# Patient Record
Sex: FEMALE | Race: WHITE | Employment: PART TIME | URBAN - METROPOLITAN AREA
[De-identification: names, ages, dates, MRNs, and addresses within clinical notes are randomized per-mention and may not be internally consistent; named-entity substitution may affect disease eponyms.]

---

## 2017-12-18 ENCOUNTER — HOSPITAL ENCOUNTER (EMERGENCY)
Age: 27
Discharge: HOME | End: 2017-12-18
Attending: EMERGENCY MEDICINE
Payer: COMMERCIAL

## 2017-12-18 VITALS
OXYGEN SATURATION: 97 % | WEIGHT: 135 LBS | BODY MASS INDEX: 26.5 KG/M2 | SYSTOLIC BLOOD PRESSURE: 137 MMHG | HEIGHT: 60 IN | TEMPERATURE: 98.3 F | DIASTOLIC BLOOD PRESSURE: 90 MMHG | RESPIRATION RATE: 14 BRPM | HEART RATE: 82 BPM

## 2017-12-18 DIAGNOSIS — S81.812A LACERATION OF LEFT LOWER EXTREMITY, INITIAL ENCOUNTER: Primary | ICD-10-CM

## 2017-12-18 PROCEDURE — 99282 EMERGENCY DEPT VISIT SF MDM: CPT

## 2017-12-18 PROCEDURE — 12011 RPR F/E/E/N/L/M 2.5 CM/<: CPT

## 2017-12-18 RX ORDER — LIDOCAINE HYDROCHLORIDE 10 MG/ML
20 INJECTION, SOLUTION INFILTRATION; PERINEURAL ONCE
Status: DISCONTINUED | OUTPATIENT
Start: 2017-12-18 | End: 2017-12-18 | Stop reason: HOSPADM

## 2017-12-18 ASSESSMENT — ENCOUNTER SYMPTOMS
VOMITING: 0
COUGH: 0
NAUSEA: 0
SHORTNESS OF BREATH: 0
TROUBLE SWALLOWING: 0

## 2017-12-18 ASSESSMENT — PAIN DESCRIPTION - ORIENTATION: ORIENTATION: LEFT

## 2017-12-18 ASSESSMENT — PAIN DESCRIPTION - PAIN TYPE: TYPE: ACUTE PAIN

## 2017-12-18 ASSESSMENT — PAIN DESCRIPTION - LOCATION: LOCATION: LEG

## 2017-12-18 ASSESSMENT — PAIN SCALES - GENERAL: PAINLEVEL_OUTOF10: 2

## 2017-12-18 NOTE — ED PROVIDER NOTES
[address]  eMERGENCY dEPARTMENT eNCOUnter      Pt Name: Felicitas Boyer  MRN: [id number]  [place] 1990  Date of evaluation: 2017  Provider: [facility], Jorgito Michaud26       Chief Complaint   Patient presents with    Laceration         HISTORY OF PRESENT ILLNESS  (Location/Symptom, Timing/Onset, Context/Setting, Quality, Duration, Modifying Factors, Severity.)   Felicitas Boyer is a 32 y.o. female who presents to the emergency department for evaluation of laceration:     Location/Symptom: Laceration to left thigh  Timing/Onset: PTA  Context/Setting: Patient Presents to ED with complaints of laceration to left anterior thigh. States she cut it accidentally with clean  when making a frame. No focal weakness/paresthesias. Bleeding controlled with pressure. Tetanus UTD?   up to date. Quality: sharp, achy  Duration: constant  Modifying Factors: worse with movement  Severity: moderate    Nursing Notes were reviewed and I agree. REVIEW OF SYSTEMS    (2-9 systems for level 4, 10 or more for level 5)     Review of Systems   Constitutional: Negative for chills and fever. HENT: Negative for trouble swallowing. Respiratory: Negative for cough and shortness of breath. Cardiovascular: Negative for chest pain and palpitations. Gastrointestinal: Negative for nausea and vomiting. Skin: Positive for wound. Except as noted above the remainder of the review of systems was reviewed and negative.        PAST MEDICAL HISTORY         Diagnosis Date    Anemia of acute blood loss 3/2/2015    ASCUS (atypical squamous cells of undetermined significance) on Pap smear 14    (+) High Risk HPV    Bipolar disorder ([facility] [address]     Cervical high risk human papillomavirus (HPV) DNA test positive 14    Constipation     due to endometriosis    Constipation     Depression 3/4/2014    Endometriosis- Clinical 2014    History of spontaneous  April 2010, Oct 2013    @ 6wks, @ 8 weeks (3-4 total)    History of total abdominal hysterectomy 10/19/15    MERCEDES/BS/RO/Enterocele repair [facility] Dr. Narciso Edouard    HSIL (high grade squamous intraepithelial lesion) on colposcopy/ECC 4/16/2015    Hx of migraines     Hypertension     HX OF AS A TEENAGER    Irregular menses     Manic depressive disorder (HCC)     Pelvic pain     Smoker      Reviewed. SURGICAL HISTORY           Procedure Laterality Date    APPENDECTOMY  10/19/15    COLPOSCOPY  7/13/15    DILATION AND CURETTAGE  11/4/13    fetal demise/suction D&C    ENTEROCELE REPAIR  10/19/15    [facility], Dr. Jon Crow, [address]  10 19 15    [facility]/BS/RO [facility], Dr. Raciel Tristan  7/13/15    diagnostic with LEEP    LEEP  7/13/15    MYRINGOTOMY AND TYMPANOSTOMY TUBE PLACEMENT Bilateral     TONSILLECTOMY AND ADENOIDECTOMY      TOOTH EXTRACTION      2 teeth extracted     Reviewed. CURRENT MEDICATIONS       Previous Medications    BUPROPION SR (WELLBUTRIN SR) 150 MG SR TABLET        CHOLECALCIFEROL (VITAMIN D) 2000 UNITS TABS        CRANBERRY PO    Take by mouth    CYANOCOBALAMIN (VITAMIN B 12 PO)    Take by mouth    DOCUSATE SODIUM (COLACE, DULCOLAX) 100 MG CAPS    Take 100 mg by mouth 2 times daily as needed for Constipation    FLAXSEED, LINSEED, (FLAX SEEDS PO)    Take by mouth    GABAPENTIN (NEURONTIN) 100 MG CAPSULE    Take 1 capsule by mouth 3 times daily TAKE ONE AT BED TIME 8PM X 3 DAYS   THEN ONE TWICE A DAY 8AM AND 8 PM X 3 DAYS   THENONE THREE TIMES A DAY AT 8AM 2PM AND 8PM    IBUPROFEN (ADVIL;MOTRIN) 600 MG TABLET    Take 1 tablet by mouth every 6 hours as needed for Pain    LEUPROLIDE (LUPRON DEPOT) 3.75 MG INJECTION    Inject 3.75 mg into the muscle every 28 days Bring to office for injection. LIDOCAINE (LMX) 4 % CREAM    Apply One to two inch length topically as needed four times a day.     OMEGA-3 FATTY ACIDS (FISH OIL PO)    Take by mouth diaphoretic. Psychiatric: She has a normal mood and affect. Her behavior is normal.       DIAGNOSTIC RESULTS     RADIOLOGY:   none      LABS:  Labs Reviewed - No data to display    All other labs were within normal range or not returned as of this dictation. EMERGENCY DEPARTMENT COURSE and DIFFERENTIAL DIAGNOSIS/MDM:   Patient to ED for evaluation of laceration. Repaired with sutures. Ok to discharge home. Wound instructions given. Sutures out in 10 days. Follow up with PCP in 1-2 days for a recheck. Return to ED if worsening pain, bleeding, signs of infection of other emergent symptoms    Vitals:    Vitals:    12/18/17 1220   BP: (!) 137/90   Pulse: 82   Resp: 14   Temp: 98.3 °F (36.8 °C)   TempSrc: Oral   SpO2: 97%   Weight: 135 lb (61.2 kg)   Height: 5' (1.524 m)       Vitals reviewed. PROCEDURES:  Lac Repair  Date/Time: 12/18/2017 1:16 PM  Performed by: Chaparrita Session by: Adán Lanier     Consent:     Consent obtained:  Verbal    Consent given by:  Patient    Risks discussed:  Infection and pain    Alternatives discussed:  No treatment  Anesthesia (see MAR for exact dosages): Anesthesia method:  Local infiltration    Local anesthetic:  Lidocaine 1% w/o epi  Laceration details:     Location:  Leg    Leg location:  L upper leg    Length (cm):  2  Repair type:     Repair type:  Simple  Pre-procedure details:     Preparation:  Patient was prepped and draped in usual sterile fashion  Treatment:     Area cleansed with:  Betadine    Irrigation solution:  Sterile saline  Skin repair:     Repair method:  Sutures    Suture size:  4-0    Suture material:  Nylon    Suture technique:  Simple interrupted    Number of sutures:  5  Post-procedure details:     Dressing:  Antibiotic ointment and non-adherent dressing    Patient tolerance of procedure: Tolerated well, no immediate complications          FINAL IMPRESSION      1.  Laceration of left lower extremity, initial encounter

## 2020-01-16 ENCOUNTER — HOSPITAL ENCOUNTER (EMERGENCY)
Age: 30
Discharge: HOME | End: 2020-01-16
Attending: EMERGENCY MEDICINE
Payer: COMMERCIAL

## 2020-01-16 ENCOUNTER — APPOINTMENT (OUTPATIENT)
Dept: GENERAL RADIOLOGY | Age: 30
End: 2020-01-16
Payer: COMMERCIAL

## 2020-01-16 VITALS
SYSTOLIC BLOOD PRESSURE: 122 MMHG | TEMPERATURE: 98.1 F | RESPIRATION RATE: 14 BRPM | WEIGHT: 120 LBS | DIASTOLIC BLOOD PRESSURE: 79 MMHG | HEART RATE: 90 BPM | HEIGHT: 60 IN | BODY MASS INDEX: 23.56 KG/M2 | OXYGEN SATURATION: 99 %

## 2020-01-16 PROCEDURE — 73130 X-RAY EXAM OF HAND: CPT

## 2020-01-16 PROCEDURE — 99283 EMERGENCY DEPT VISIT LOW MDM: CPT

## 2020-01-16 PROCEDURE — 73110 X-RAY EXAM OF WRIST: CPT

## 2020-01-16 ASSESSMENT — PAIN DESCRIPTION - LOCATION: LOCATION: WRIST

## 2020-01-16 ASSESSMENT — PAIN DESCRIPTION - ORIENTATION: ORIENTATION: RIGHT

## 2020-01-16 ASSESSMENT — PAIN SCALES - GENERAL: PAINLEVEL_OUTOF10: 3

## 2020-01-16 ASSESSMENT — PAIN DESCRIPTION - PAIN TYPE: TYPE: ACUTE PAIN

## 2020-01-16 ASSESSMENT — PAIN DESCRIPTION - DESCRIPTORS: DESCRIPTORS: SHARP

## 2020-01-16 NOTE — ED PROVIDER NOTES
[address]  eMERGENCY dEPARTMENT eNCOUnter      Pt Name: Lakisha Virk  MRN: [id number]  [place] 1990  Date of evaluation: 2020  Provider: Peggi Claude, PA-C    CHIEF COMPLAINT       Chief Complaint   Patient presents with    Wrist Injury           HISTORY OF PRESENT ILLNESS  (Location/Symptom, Timing/Onset, Context/Setting, Quality, Duration, Modifying Factors, Severity.)   Lakisha Virk is a 34 y.o. female who presents to the emergency department with complaints of right hand and wrist pain. Pt states last night she fell while skating. States the pain is 3/10 over palm of hand and wrist.  Pain will radiate into forearm with movement. Denies numbness. She is right handed. No other complaints. Nursing Notes were reviewed. REVIEW OF SYSTEMS    (2-9 systems for level 4, 10 or more for level 5)     Review of Systems   Hand, wrist pain      Except as noted above the remainder of the review of systems was reviewed and negative.        PAST MEDICAL HISTORY     Past Medical History:   Diagnosis Date    Anemia of acute blood loss 3/2/2015    ASCUS (atypical squamous cells of undetermined significance) on Pap smear 14    (+) High Risk HPV    Bipolar disorder ([facility] [address]     Cervical high risk human papillomavirus (HPV) DNA test positive 14    Constipation     due to endometriosis    Constipation     Depression 3/4/2014    Endometriosis- Clinical 2014    History of spontaneous  2010, Oct 2013    @ 6wks, @ 8 weeks (3-4 total)    History of total abdominal hysterectomy 10/19/15    MERCEDES/BS/RO/Enterocele repair Kana Varma    HSIL (high grade squamous intraepithelial lesion) on colposcopy/ECC 2015    Hx of migraines     Hypertension     HX OF AS A TEENAGER    Irregular menses     Manic depressive disorder (HCC)     Pelvic pain     Smoker      None otherwise stated in nurses notes    SURGICAL HISTORY       Past Surgical History:   Procedure Laterality Date    APPENDECTOMY  10/19/15    COLPOSCOPY  7/13/15    DILATION AND CURETTAGE  11/4/13    fetal demise/suction D&C    ENTEROCELE REPAIR  10/19/15    [facility], Dr. Juanjose Henao, TOTAL ABDOMINAL  10 19 15    MERCEDES/BS/RO [facility], Dr. Brunilda Cross  7/13/15    diagnostic with LEEP    LEEP  7/13/15    MYRINGOTOMY AND TYMPANOSTOMY TUBE PLACEMENT Bilateral     TONSILLECTOMY AND ADENOIDECTOMY      TOOTH EXTRACTION      2 teeth extracted     None otherwise stated in nurses notes    CURRENT MEDICATIONS       Previous Medications    BUPROPION SR (WELLBUTRIN SR) 150 MG SR TABLET        CHOLECALCIFEROL (VITAMIN D) 2000 UNITS TABS        CRANBERRY PO    Take by mouth    CYANOCOBALAMIN (VITAMIN B 12 PO)    Take by mouth    DOCUSATE SODIUM (COLACE, DULCOLAX) 100 MG CAPS    Take 100 mg by mouth 2 times daily as needed for Constipation    FLAXSEED, LINSEED, (FLAX SEEDS PO)    Take by mouth    GABAPENTIN (NEURONTIN) 100 MG CAPSULE    Take 1 capsule by mouth 3 times daily TAKE ONE AT BED TIME 8PM X 3 DAYS   THEN ONE TWICE A DAY 8AM AND 8 PM X 3 DAYS   THENONE THREE TIMES A DAY AT 8AM 2PM AND 8PM    IBUPROFEN (ADVIL;MOTRIN) 600 MG TABLET    Take 1 tablet by mouth every 6 hours as needed for Pain    LEUPROLIDE (LUPRON DEPOT) 3.75 MG INJECTION    Inject 3.75 mg into the muscle every 28 days Bring to office for injection. LIDOCAINE (LMX) 4 % CREAM    Apply One to two inch length topically as needed four times a day. OMEGA-3 FATTY ACIDS (FISH OIL PO)    Take by mouth       ALLERGIES     Patient has no known allergies.     FAMILY HISTORY           Problem Relation Age of Onset    Hypertension Father     Heart Attack Father     Mental Illness Father         borderline schizophrenic    Stroke Mother     Depression Mother     Mental Illness Mother     Diabetes Mother     COPD Mother     Other Mother         fibromyalgia    Seizures Other     Other Other tenderness. FROM. 5/5 strength. Good  strength. Brisk cap refill. Distal sensation intact. 2/2 radial pulse. Neurological: Alert and oriented to person, place, and time. GCS score is 15. Skin: Skin is warm and dry. No rash noted. No erythema. No pallor. Psychiatric: Mood, memory, affect and judgment normal.           DIAGNOSTIC RESULTS     EKG: All EKG's are interpreted by the Emergency Department Physician who either signs or Co-signs this chart in the absence of a cardiologist.        RADIOLOGY:   All plain film, CT, MRI, and formal ultrasound images (except ED bedside ultrasound) are read by the radiologist, see reports below, unless otherwise noted in MDM or here. XR HAND RIGHT (MIN 3 VIEWS)   Final Result   No acute bony or joint abnormalities seen in the right hand or right wrist         XR WRIST RIGHT (MIN 3 VIEWS)   Final Result   No acute bony or joint abnormalities seen in the right hand or right wrist             No results found. LABS:  Labs Reviewed - No data to display    All other labs were within normal range or not returned as of this dictation. EMERGENCY DEPARTMENT COURSE and DIFFERENTIAL DIAGNOSIS/MDM:   Vitals:    Vitals:    01/16/20 1325   BP: 122/79   Pulse: 90   Resp: 14   Temp: 98.1 °F (36.7 °C)   TempSrc: Temporal   SpO2: 99%   Weight: 120 lb (54.4 kg)   Height: 5' (1.524 m)         Patient instructed to return to the emergency room if symptoms worsen, return, or any other concern right away which is agreed by the patient    ED MEDS:  No orders of the defined types were placed in this encounter. CONSULTS:  None    PROCEDURES:  None      FINAL IMPRESSION      1.  Sprain of right wrist, initial encounter          DISPOSITION/PLAN   DISPOSITION Decision To Discharge    PATIENT REFERRED TO:  [facility] SPECIALIZED SURGERY  [address]  [address] [address] [phone]  Call       [facility] ED  [facility] [place] [address] [facility], [facility], [address] [facility]  [address]  [phone]            DISCHARGE MEDICATIONS:  New Prescriptions    No medications on file         Summation      Patient Course:    Fall, right hand and wrist pain. On exam, no deformities. No snuff box tenderness. xrays are unremarkable. Will provide wrist splint. Referral to ortho. Rest, ice, elevate. Discussed results and plan with the pt. They expressed appropriate understanding. Pt given close follow up, supportive care instructions and strict return instructions at the bedside. ED Medications administered this visit:  Medications - No data to display    New Prescriptions from this visit:    New Prescriptions    No medications on file       Follow-up:  [facility] SUR[facility]ERY  [facility] [address] [phone]  Call       [facility] ED  [facility] [id number]  [address] [place] [address] [facility], [address] [facility]  [address]  [phone]              Final Impression:   1.  Sprain of right wrist, initial encounter               (Please note that portions of this note were completed with a voice recognition program.  Efforts were made to edit the dictations but occasionally words are mis-transcribed.)      (Please note that portions of this note were completed with a voice recognition program.  Efforts were made to edit the dictations but occasionally words are mis-transcribed.)    Donna Medina. [address], PA-C  01/16/20 1427

## 2022-04-17 ENCOUNTER — APPOINTMENT (OUTPATIENT)
Dept: CT IMAGING | Age: 32
End: 2022-04-17
Payer: COMMERCIAL

## 2022-04-17 ENCOUNTER — HOSPITAL ENCOUNTER (EMERGENCY)
Age: 32
Discharge: HOME | End: 2022-04-18
Attending: EMERGENCY MEDICINE
Payer: COMMERCIAL

## 2022-04-17 VITALS
HEIGHT: 60 IN | OXYGEN SATURATION: 99 % | WEIGHT: 130 LBS | SYSTOLIC BLOOD PRESSURE: 134 MMHG | BODY MASS INDEX: 25.52 KG/M2 | TEMPERATURE: 98.5 F | HEART RATE: 90 BPM | RESPIRATION RATE: 16 BRPM | DIASTOLIC BLOOD PRESSURE: 86 MMHG

## 2022-04-17 DIAGNOSIS — L03.211 FACIAL CELLULITIS: Primary | ICD-10-CM

## 2022-04-17 LAB
ABSOLUTE EOS #: 0.2 K/UL (ref 0–0.4)
ABSOLUTE LYMPH #: 2.7 K/UL (ref 1–4.8)
ABSOLUTE MONO #: 1 K/UL (ref 0.1–1.3)
ANION GAP SERPL CALCULATED.3IONS-SCNC: 11 MMOL/L (ref 9–17)
BASOPHILS # BLD: 1 % (ref 0–2)
BASOPHILS ABSOLUTE: 0.1 K/UL (ref 0–0.2)
BUN BLDV-MCNC: 4 MG/DL (ref 6–20)
CALCIUM SERPL-MCNC: 8.2 MG/DL (ref 8.6–10.4)
CHLORIDE BLD-SCNC: 99 MMOL/L (ref 98–107)
CO2: 23 MMOL/L (ref 20–31)
CREAT SERPL-MCNC: <0.4 MG/DL (ref 0.5–0.9)
EOSINOPHILS RELATIVE PERCENT: 2 % (ref 0–4)
GFR AFRICAN AMERICAN: ABNORMAL ML/MIN
GFR NON-AFRICAN AMERICAN: ABNORMAL ML/MIN
GFR SERPL CREATININE-BSD FRML MDRD: ABNORMAL ML/MIN/{1.73_M2}
GLUCOSE BLD-MCNC: 98 MG/DL (ref 70–99)
HCT VFR BLD CALC: 40 % (ref 36–46)
HEMOGLOBIN: 14 G/DL (ref 12–16)
LYMPHOCYTES # BLD: 26 % (ref 24–44)
MAGNESIUM: 1.6 MG/DL (ref 1.6–2.6)
MCH RBC QN AUTO: 35.6 PG (ref 26–34)
MCHC RBC AUTO-ENTMCNC: 34.9 G/DL (ref 31–37)
MCV RBC AUTO: 101.9 FL (ref 80–100)
MONOCYTES # BLD: 10 % (ref 1–7)
PDW BLD-RTO: 13.8 % (ref 11.5–14.9)
PLATELET # BLD: 282 K/UL (ref 150–450)
PMV BLD AUTO: 8.3 FL (ref 6–12)
POTASSIUM SERPL-SCNC: 3.1 MMOL/L (ref 3.7–5.3)
RBC # BLD: 3.92 M/UL (ref 4–5.2)
SEG NEUTROPHILS: 61 % (ref 36–66)
SEGMENTED NEUTROPHILS ABSOLUTE COUNT: 6.4 K/UL (ref 1.3–9.1)
SODIUM BLD-SCNC: 133 MMOL/L (ref 135–144)
WBC # BLD: 10.3 K/UL (ref 3.5–11)

## 2022-04-17 PROCEDURE — 70491 CT SOFT TISSUE NECK W/DYE: CPT

## 2022-04-17 PROCEDURE — 6360000004 HC RX CONTRAST MEDICATION: Performed by: STUDENT IN AN ORGANIZED HEALTH CARE EDUCATION/TRAINING PROGRAM

## 2022-04-17 PROCEDURE — 99283 EMERGENCY DEPT VISIT LOW MDM: CPT

## 2022-04-17 PROCEDURE — 6360000002 HC RX W HCPCS: Performed by: STUDENT IN AN ORGANIZED HEALTH CARE EDUCATION/TRAINING PROGRAM

## 2022-04-17 PROCEDURE — 85025 COMPLETE CBC W/AUTO DIFF WBC: CPT

## 2022-04-17 PROCEDURE — 96375 TX/PRO/DX INJ NEW DRUG ADDON: CPT

## 2022-04-17 PROCEDURE — 2580000003 HC RX 258: Performed by: STUDENT IN AN ORGANIZED HEALTH CARE EDUCATION/TRAINING PROGRAM

## 2022-04-17 PROCEDURE — 80048 BASIC METABOLIC PNL TOTAL CA: CPT

## 2022-04-17 PROCEDURE — 96376 TX/PRO/DX INJ SAME DRUG ADON: CPT

## 2022-04-17 PROCEDURE — 83735 ASSAY OF MAGNESIUM: CPT

## 2022-04-17 PROCEDURE — 36415 COLL VENOUS BLD VENIPUNCTURE: CPT

## 2022-04-17 PROCEDURE — 2500000003 HC RX 250 WO HCPCS: Performed by: STUDENT IN AN ORGANIZED HEALTH CARE EDUCATION/TRAINING PROGRAM

## 2022-04-17 PROCEDURE — 96365 THER/PROPH/DIAG IV INF INIT: CPT

## 2022-04-17 RX ORDER — ONDANSETRON 2 MG/ML
4 INJECTION INTRAMUSCULAR; INTRAVENOUS ONCE
Status: COMPLETED | OUTPATIENT
Start: 2022-04-17 | End: 2022-04-17

## 2022-04-17 RX ORDER — SODIUM CHLORIDE 0.9 % (FLUSH) 0.9 %
10 SYRINGE (ML) INJECTION PRN
Status: DISCONTINUED | OUTPATIENT
Start: 2022-04-17 | End: 2022-04-18 | Stop reason: HOSPADM

## 2022-04-17 RX ORDER — CLINDAMYCIN PHOSPHATE 600 MG/50ML
600 INJECTION INTRAVENOUS ONCE
Status: COMPLETED | OUTPATIENT
Start: 2022-04-17 | End: 2022-04-18

## 2022-04-17 RX ORDER — 0.9 % SODIUM CHLORIDE 0.9 %
80 INTRAVENOUS SOLUTION INTRAVENOUS ONCE
Status: COMPLETED | OUTPATIENT
Start: 2022-04-17 | End: 2022-04-17

## 2022-04-17 RX ORDER — IBUPROFEN 400 MG/1
800 TABLET ORAL EVERY 8 HOURS PRN
Qty: 120 TABLET | Refills: 0 | Status: SHIPPED | OUTPATIENT
Start: 2022-04-17

## 2022-04-17 RX ORDER — MORPHINE SULFATE 2 MG/ML
2 INJECTION, SOLUTION INTRAMUSCULAR; INTRAVENOUS ONCE
Status: COMPLETED | OUTPATIENT
Start: 2022-04-17 | End: 2022-04-17

## 2022-04-17 RX ORDER — CLINDAMYCIN HYDROCHLORIDE 150 MG/1
450 CAPSULE ORAL 3 TIMES DAILY
Qty: 63 CAPSULE | Refills: 0 | Status: SHIPPED | OUTPATIENT
Start: 2022-04-17 | End: 2022-04-24

## 2022-04-17 RX ADMIN — SODIUM CHLORIDE, PRESERVATIVE FREE 10 ML: 5 INJECTION INTRAVENOUS at 22:59

## 2022-04-17 RX ADMIN — MORPHINE SULFATE 2 MG: 2 INJECTION, SOLUTION INTRAMUSCULAR; INTRAVENOUS at 23:29

## 2022-04-17 RX ADMIN — ONDANSETRON 4 MG: 2 INJECTION INTRAMUSCULAR; INTRAVENOUS at 22:09

## 2022-04-17 RX ADMIN — SODIUM CHLORIDE 80 ML: 9 INJECTION, SOLUTION INTRAVENOUS at 22:59

## 2022-04-17 RX ADMIN — CLINDAMYCIN IN 5 PERCENT DEXTROSE 600 MG: 12 INJECTION, SOLUTION INTRAVENOUS at 23:30

## 2022-04-17 RX ADMIN — IOPAMIDOL 75 ML: 755 INJECTION, SOLUTION INTRAVENOUS at 22:59

## 2022-04-17 RX ADMIN — MORPHINE SULFATE 2 MG: 2 INJECTION, SOLUTION INTRAMUSCULAR; INTRAVENOUS at 22:10

## 2022-04-17 ASSESSMENT — PAIN SCALES - GENERAL
PAINLEVEL_OUTOF10: 9
PAINLEVEL_OUTOF10: 10

## 2022-04-17 ASSESSMENT — ENCOUNTER SYMPTOMS
CHEST TIGHTNESS: 0
ABDOMINAL PAIN: 0
FACIAL SWELLING: 1
SINUS PAIN: 0
SINUS PRESSURE: 0
BACK PAIN: 0
DIARRHEA: 0
NAUSEA: 0
COUGH: 0
SORE THROAT: 0
CONSTIPATION: 0
VOICE CHANGE: 0
SHORTNESS OF BREATH: 0
TROUBLE SWALLOWING: 1
VOMITING: 0
COLOR CHANGE: 0

## 2022-04-17 ASSESSMENT — PAIN DESCRIPTION - PAIN TYPE: TYPE: ACUTE PAIN

## 2022-04-17 ASSESSMENT — PAIN DESCRIPTION - DIRECTION: RADIATING_TOWARDS: EAR

## 2022-04-17 ASSESSMENT — PAIN DESCRIPTION - ORIENTATION: ORIENTATION: LEFT

## 2022-04-17 ASSESSMENT — PAIN DESCRIPTION - LOCATION: LOCATION: FACE

## 2022-04-17 ASSESSMENT — PAIN - FUNCTIONAL ASSESSMENT: PAIN_FUNCTIONAL_ASSESSMENT: 0-10

## 2022-04-18 NOTE — ED PROVIDER NOTES
[address] ED  eMERGENCY dEPARTMENT eNCOUnter   Attending Attestation     Pt Name: Apple Hayes  MRN: [id number]  [place] 1990  Date of evaluation: 4/17/22    History, EXAM, MDM:    Apple Hayes is a 28 y.o. female who presents with Facial Swelling (L)    Presenting with 2 days of left facial swelling and pain. On exam the patient has significant erythema edema tenderness on her left mandible area that is starting to spread down her neck. No apical abscess amenable to bedside drainage. Laboratory studies checked and show mild hypokalemia. CT scan shows celluitis. Pt treated with IV antiboitics and placed on oral antibiotics. Recommended close follow up with pcp and dentist, return to ED if symptoms worsen, and warning precautions provided. .     Vitals:   Vitals:    04/17/22 2121   BP: 134/86   Pulse: 90   Resp: 16   Temp: 98.5 °F (36.9 °C)   TempSrc: Oral   SpO2: 99%   Weight: 130 lb (59 kg)   Height: 5' (1.524 m)     I performed a history and physical examination of the patient and discussed management with the resident. I reviewed the residents note and agree with the documented findings and plan of care. Any areas of disagreement are noted on the chart. I was personally present for the key portions of any procedures. I have documented in the chart those procedures where I was not present during the key portions. I have personally reviewed all images and agree with the resident's interpretation. I have reviewed the emergency nurses triage note. I agree with the chief complaint, past medical history, past surgical history, allergies, medications, social and family history as documented unless otherwise noted below. Documentation of the HPI, Physical Exam and Medical Decision Making performed by medical students or scribes is based on my personal performance of the HPI, PE and MDM.  For Phys Assistant/ Nurse Practitioner cases/documentation I have had a face to face evaluation of this patient and have completed at least one if not all key elements of the E/M (history, physical exam, and MDM). Additional findings are as noted.     Gautam Lim MD  Attending Emergency  Physician                Gautam Lim MD  04/18/22 0211

## 2022-04-18 NOTE — ED TRIAGE NOTES
Mode of arrival (squad #, walk in, police, etc) : walk-in        Chief complaint(s): L facial swelling        Arrival Note (brief scenario, treatment PTA, etc). : c/o increased L facial swelling x1-2 days with radiation to L ear. Took tylenol with no relief. Attempted heat application, swelling increased. Admits to old tooth fracture. Able to speak clearly and maintain secretions. C= \"Have you ever felt that you should Cut down on your drinking? \"  No  A= \"Have people Annoyed you by criticizing your drinking? \"  No  G= \"Have you ever felt bad or Guilty about your drinking? \"  No  E= \"Have you ever had a drink as an Eye-opener first thing in the morning to steady your nerves or to help a hangover? \"  No      Deferred []      Reason for deferring: N/A    *If yes to two or more: probable alcohol abuse. *

## 2022-04-18 NOTE — ED PROVIDER NOTES
[address] [facility] ED  Emergency Department Encounter  Emergency Medicine Resident     Pt Name: Vanessa Guardado  MRN: [id number]  [place] 1990  Date of evaluation: 22  PCP:  No primary care provider on file. CHIEF COMPLAINT       Chief Complaint   Patient presents with    Facial Swelling     L       HISTORY OFPRESENT ILLNESS  (Location/Symptom, Timing/Onset, Context/Setting, Quality, Duration, Modifying Factors,Severity.)      Vanessa Guardado is a 28 y. o.yo female who presents with concern for acute onset left-sided facial swelling. Patient states that she has known broken teeth on the left lower side that she is scheduled to get pulled this week. Patient states that yesterday she began develop some mild swelling noted to the left lower jaw. She states today this is quickly progressed and now she is having ear pain as well as swelling going down into her neck. Patient states that within the last 20 minutes she feels a little bit difficulty swallowing. She denies any fever, chills nausea or vomiting. Patient does admit to heavy smoking, states that she has had broken teeth for quite some time but has never had swelling like this previously. She reports mild pain with opening her mouth though she is still able to. PAST MEDICAL / SURGICAL / SOCIAL / FAMILY HISTORY      has a past medical history of Anemia of acute blood loss, ASCUS (atypical squamous cells of undetermined significance) on Pap smear, Bipolar disorder ([address] Cervical high risk human papillomavirus (HPV) DNA test positive, Constipation, Constipation, Depression, Endometriosis- Clinical, History of spontaneous , History of total abdominal hysterectomy, HSIL (high grade squamous intraepithelial lesion) on colposcopy/ECC, Hx of migraines, Hypertension, Irregular menses, Manic depressive disorder ([address] Pelvic pain, and Smoker.      has a past surgical history that includes Dilation & curettage (13); Myringotomy Tympanostomy Tube Placement (Bilateral); LEEP (7/13/15); Colposcopy (7/13/15); laparoscopy (7/13/15); Tonsillectomy and adenoidectomy; Tooth Extraction; Appendectomy (10/19/15); Hysterectomy, total abdominal (10 19 15); and Enterocele repair (10/19/15). Social History     Socioeconomic History    Marital status:      Spouse name: Not on file    Number of children: 2    Years of education: Not on file    Highest education level: Not on file   Occupational History    Not on file   Tobacco Use    Smoking status: Current Some Day Smoker     Packs/day: 2.00     Types: Cigars     Last attempt to quit: 2014     Years since quittin.6    Smokeless tobacco: Never Used    Tobacco comment: black & mild cigars, 2 per day   Substance and Sexual Activity    Alcohol use: Yes     Alcohol/week: 0.0 standard drinks     Comment: social    Drug use: No     Comment: 7-8 years marijuana use    Sexual activity: Yes     Partners: Male     Birth control/protection: Surgical     Comment: hyst   Other Topics Concern    Not on file   Social History Narrative    Not on file     Social Determinants of Health     Financial Resource Strain:     Difficulty of Paying Living Expenses: Not on file   Food Insecurity:     Worried About Running Out of Food in the Last Year: Not on file    Andrew of Food in the Last Year: Not on file   Transportation Needs:     Lack of Transportation (Medical): Not on file    Lack of Transportation (Non-Medical):  Not on file   Physical Activity:     Days of Exercise per Week: Not on file    Minutes of Exercise per Session: Not on file   Stress:     Feeling of Stress : Not on file   Social Connections:     Frequency of Communication with Friends and Family: Not on file    Frequency of Social Gatherings with Friends and Family: Not on file    Attends Sikhism Services: Not on file    Active Member of Clubs or Organizations: Not on file    Attends Club or swelling. Gastrointestinal: Negative for abdominal pain, constipation, diarrhea, nausea and vomiting. Genitourinary: Negative for difficulty urinating, dysuria, flank pain and urgency. Musculoskeletal: Negative for back pain, neck pain and neck stiffness. Skin: Negative for color change, pallor and rash. Neurological: Negative for dizziness, tremors, speech difficulty, weakness, light-headedness and headaches. PHYSICAL EXAM   (up to 7 for level 4, 8 or more forlevel 5)      INITIAL VITALS:   ED Triage Vitals [04/17/22 2121]   BP Temp Temp Source Pulse Resp SpO2 Height Weight   134/86 98.5 °F (36.9 °C) Oral 90 16 99 % 5' (1.524 m) 130 lb (59 kg)       Physical Exam  Constitutional:       General: She is not in acute distress. HENT:      Head: Normocephalic and atraumatic. Right Ear: Ear canal and external ear normal.      Left Ear: Ear canal and external ear normal.      Nose: Nose normal. No rhinorrhea. Mouth/Throat:      Mouth: Mucous membranes are moist.      Dentition: Abnormal dentition. Dental tenderness and dental caries present. Comments: No palpable dental abscess, patient has significant swelling noted to the left mandibular region extending down into the submandibular space. Patient is able to open her mouth roughly 3 fingerbreadths. No tonsillar swelling, no signs of pharyngeal abscess. Eyes:      Extraocular Movements: Extraocular movements intact. Pupils: Pupils are equal, round, and reactive to light. Cardiovascular:      Rate and Rhythm: Normal rate and regular rhythm. Pulses: Normal pulses. Heart sounds: No murmur heard. Pulmonary:      Effort: Pulmonary effort is normal.      Breath sounds: Normal breath sounds. No stridor. No wheezing. Abdominal:      Palpations: Abdomen is soft. Tenderness: There is no abdominal tenderness. Musculoskeletal:         General: No swelling. Normal range of motion.       Cervical back: Normal range of motion. Skin:     General: Skin is warm and dry. Neurological:      General: No focal deficit present. Mental Status: She is alert and oriented to person, place, and time. DIFFERENTIAL  DIAGNOSIS     PLAN (LABS / IMAGING / EKG):  Orders Placed This Encounter   Procedures    CT SOFT TISSUE NECK W CONTRAST    CBC with Auto Differential    Basic Metabolic Panel w/ Reflex to MG    Magnesium       MEDICATIONS ORDERED:  Orders Placed This Encounter   Medications    morphine (PF) injection 2 mg    ondansetron (ZOFRAN) injection 4 mg    0.9 % sodium chloride bolus    sodium chloride flush 0.9 % injection 10 mL    iopamidol (ISOVUE-370) 76 % injection 75 mL    clindamycin (CLEOCIN) 600 mg in dextrose 5 % 50 mL IVPB     Order Specific Question:   Antimicrobial Indications     Answer:   Head and Neck Infection    clindamycin (CLEOCIN) 150 MG capsule     Sig: Take 3 capsules by mouth 3 times daily for 7 days     Dispense:  63 capsule     Refill:  0    ibuprofen (IBU) 400 MG tablet     Sig: Take 2 tablets by mouth every 8 hours as needed for Pain     Dispense:  120 tablet     Refill:  0    morphine (PF) injection 2 mg       DDX: Dental abscess, dental caries, Jose's angina, submandibular abscess, cellulitis    Initial MDM/Plan: 28 y.o. female who presents with concern for acute onset left-sided facial swelling. Patient has very poor dentition supposed to have her teeth pulled this week. Swelling has been new and rapidly progressing over the last 24 hours. She reports severe pain in this area. On exam patient has significant swelling to the left lower mandible extending into the submandibular space. She does have difficulty opening her mouth or she is still able to at this time. No changes in phonation, no signs of retropharyngeal abscess. We will plan for lab work and CT scan as well as pain control.   Patient will need antibiotics    DIAGNOSTIC RESULTS / EMERGENCYDEPARTMENT COURSE / MDM LABS:  Labs Reviewed   CBC WITH AUTO DIFFERENTIAL - Abnormal; Notable for the following components:       Result Value    RBC 3.92 (*)     .9 (*)     MCH 35.6 (*)     Monocytes 10 (*)     All other components within normal limits   BASIC METABOLIC PANEL W/ REFLEX TO MG FOR LOW K - Abnormal; Notable for the following components:    BUN 4 (*)     CREATININE <0.40 (*)     Calcium 8.2 (*)     Sodium 133 (*)     Potassium 3.1 (*)     All other components within normal limits   MAGNESIUM         RADIOLOGY:  CT SOFT TISSUE NECK W CONTRAST    Result Date: 4/17/2022  EXAMINATION: CT OF THE NECK SOFT TISSUE WITH CONTRAST  4/17/2022 TECHNIQUE: CT of the neck was performed with the administration of intravenous contrast. Multiplanar reformatted images are provided for review. Dose modulation, iterative reconstruction, and/or weight based adjustment of the mA/kV was utilized to reduce the radiation dose to as low as reasonably achievable. COMPARISON: None. HISTORY: ORDERING SYSTEM PROVIDED HISTORY: concern for ludwigs, Left facial swelling TECHNOLOGIST PROVIDED HISTORY: concern for ludwigs, Left facial swelling Decision Support Exception - unselect if not a suspected or confirmed emergency medical condition->Emergency Medical Condition (MA) Reason for Exam: concern for ludwigs, Left facial swelling Additional signs and symptoms: Pt c/o abscess, left facial swelling for 1 day. FINDINGS: PHARYNX/LARYNX:  The palatine tonsils are normal in appearance. The tongue is normal in appearance. The valleculae, epiglottis, aryepiglottic folds and pyriform sinuses appear unremarkable. The true and false vocal cords are normal in appearance. No mass or abscess is seen. SALIVARY GLANDS/THYROID:  The parotid and submandibular glands appear unremarkable. The thyroid gland appears unremarkable. LYMPH NODES:  No cervical or supraclavicular lymphadenopathy is seen.  SOFT TISSUES:  There is mild asymmetric soft tissue swelling within the left cheek region and adjacent to the left aspect of the mandible. There is a small hypodense area adjacent to the left aspect of the mandible that may represent a phlegmon. BRAIN/ORBITS/SINUSES:  The visualized portion of the intracranial contents appear unremarkable. The visualized portion of the orbits, paranasal sinuses and mastoid air cells demonstrate no acute abnormality. LUNG APICES/SUPERIOR MEDIASTINUM:  No focal consolidation is seen within the visualized lung apices. No superior mediastinal lymphadenopathy or mass. The visualized portion of the trachea appears unremarkable. BONES:  No aggressive appearing lytic or blastic bony lesion. Soft tissue swelling in the left cheek region and adjacent to the left aspect of the mandible likely representing cellulitis. Small asymmetric hypodense area is seen adjacent to the left aspect of the mandible that may represent a phlegmon. EKG      All EKG's are interpreted by the Emergency Department Physicianwho either signs or Co-signs this chart in the absence of a cardiologist.    EMERGENCY DEPARTMENT COURSE:  ED Course as of 04/17/22 2327   Sun Apr 17, 2022   2143 Patient seen and evaluated, airway intact. [CD]   2229 No leukocytosis noted on CBC. [CD]   3305 CT scan with no obvious Jose's angina. Patient does have cellulitis with underlying phlegmon noted just adjacent to the left mandible. Will give a dose of IV clindamycin here and discharge patient home on oral clindamycin. [CD]   2325 Patient updated on plan of care. She is comfortable with this. She states that she will call her dentist in the morning. [CD]      ED Course User Index  [CD] Dedra Bashir,           PROCEDURES:  None    CONSULTS:  None    CRITICAL CARE:  Please see attending documentation    FINAL IMPRESSION      1.  Facial cellulitis          DISPOSITION / PLAN     DISPOSITION    Decision to discharge    PATIENT REFERRED TO:  [address]  [address] [facility] [id number]  Call in 2 days  For ED follow up    [facility] ED  [place] [address]  [phone]  Go to   If symptoms worsen      DISCHARGE MEDICATIONS:  New Prescriptions    CLINDAMYCIN (CLEOCIN) 150 MG CAPSULE    Take 3 capsules by mouth 3 times daily for 7 days    IBUPROFEN (IBU) 400 MG TABLET    Take 2 tablets by mouth every 8 hours as needed for Pain       Citlaly Williamson DO  Emergency Medicine Resident    (Please note that portions of this note were completed with a voice recognition program.Efforts were made to edit the dictations but occasionally words are mis-transcribed.)        Citlaly Williamson DO  Resident  04/17/22 9871